# Patient Record
Sex: MALE | URBAN - METROPOLITAN AREA
[De-identification: names, ages, dates, MRNs, and addresses within clinical notes are randomized per-mention and may not be internally consistent; named-entity substitution may affect disease eponyms.]

---

## 2024-01-17 ENCOUNTER — APPOINTMENT (OUTPATIENT)
Dept: URBAN - METROPOLITAN AREA SURGERY 9 | Age: 34
Setting detail: DERMATOLOGY
End: 2024-01-17

## 2024-01-17 DIAGNOSIS — I73.81 ERYTHROMELALGIA: ICD-10-CM

## 2024-01-17 PROCEDURE — OTHER MIPS QUALITY: OTHER

## 2024-01-17 PROCEDURE — 99203 OFFICE O/P NEW LOW 30 MIN: CPT

## 2024-01-17 PROCEDURE — OTHER ORDER TESTS: OTHER

## 2024-01-17 PROCEDURE — OTHER COUNSELING: OTHER

## 2024-01-17 PROCEDURE — OTHER OTC TREATMENT REGIMEN: OTHER

## 2024-01-17 ASSESSMENT — LOCATION DETAILED DESCRIPTION DERM
LOCATION DETAILED: LEFT DORSAL GREAT TOE
LOCATION DETAILED: LEFT DORSAL 5TH TOE
LOCATION DETAILED: RIGHT DORSAL 3RD TOE
LOCATION DETAILED: RIGHT DORSAL GREAT TOE
LOCATION DETAILED: LEFT DORSAL 3RD TOE
LOCATION DETAILED: RIGHT DORSAL 5TH TOE
LOCATION DETAILED: LEFT DORSAL 2ND TOE
LOCATION DETAILED: RIGHT DORSAL 2ND TOE
LOCATION DETAILED: RIGHT DORSAL 4TH TOE
LOCATION DETAILED: LEFT DORSAL 4TH TOE

## 2024-01-17 ASSESSMENT — LOCATION SIMPLE DESCRIPTION DERM
LOCATION SIMPLE: LEFT 4TH TOE
LOCATION SIMPLE: LEFT 3RD TOE
LOCATION SIMPLE: RIGHT 5TH TOE
LOCATION SIMPLE: RIGHT 4TH TOE
LOCATION SIMPLE: RIGHT GREAT TOE
LOCATION SIMPLE: RIGHT 3RD TOE
LOCATION SIMPLE: LEFT 5TH TOE
LOCATION SIMPLE: RIGHT 2ND TOE
LOCATION SIMPLE: LEFT GREAT TOE
LOCATION SIMPLE: LEFT 2ND TOE

## 2024-01-17 ASSESSMENT — LOCATION ZONE DERM: LOCATION ZONE: TOE

## 2024-01-17 NOTE — PROCEDURE: ORDER TESTS
Performing Laboratory: [id number]
Bill For Surgical Tray: no
Lab Facility: [id number]
Expected Date Of Service: 01/17/2024
Billing Type: Third-Party Bill

## 2024-01-17 NOTE — PROCEDURE: OTC TREATMENT REGIMEN
Detail Level: Zone
Patient Specific Otc Recommendations (Will Not Stick From Patient To Patient): For active flares, try topical dermeleve to help control the itch and otc bengay with lidocaine to help control the pain\\nContinue ice/elevation prn active flares\\n\\nRecommend daily low dose aspirin \\n\\nIf persists or worsens consider RX lidocaine-prilocaine cream.\\n\\nWill obtain labs to rule out potential underlying myeloproliferative disorder. May require referral to hematologist.
Samples Given: Dermeleve

## 2024-01-17 NOTE — HPI: OTHER
Condition:: Possible athletes foot
Please Describe Your Condition:: is a new patient who is being seen for a chief complaint of possible athletes foot. Symptoms on both feet towards heel area and top of feet. He had athletes foot about 4 months ago and used lotrimin which cleared it up in a week. And then 3 weeks later he's noticing flares again. Says it's hot to touch, very sore to the point he can't sleep and itchy. States the soreness would get worst around bedtime. He's been treating it like athletes foot by showering and changing bath towels but nothing seems to help. When he went to urgent care he says PA was not sure what it was but she did not think it was athletes foot.

## 2024-01-26 ENCOUNTER — RX ONLY (RX ONLY)
Age: 34
End: 2024-01-26

## 2024-01-26 RX ORDER — LIDOCAINE 50 MG/G
PATCH CUTANEOUS
Qty: 30 | Refills: 0 | Status: ERX | COMMUNITY
Start: 2024-01-26